# Patient Record
Sex: MALE | Race: OTHER | HISPANIC OR LATINO | Employment: FULL TIME | URBAN - METROPOLITAN AREA
[De-identification: names, ages, dates, MRNs, and addresses within clinical notes are randomized per-mention and may not be internally consistent; named-entity substitution may affect disease eponyms.]

---

## 2023-05-28 ENCOUNTER — HOSPITAL ENCOUNTER (EMERGENCY)
Facility: HOSPITAL | Age: 38
Discharge: HOME | End: 2023-05-29
Attending: EMERGENCY MEDICINE
Payer: COMMERCIAL

## 2023-05-28 DIAGNOSIS — S61.412A LACERATION OF LEFT HAND WITHOUT FOREIGN BODY, INITIAL ENCOUNTER: Primary | ICD-10-CM

## 2023-05-28 PROCEDURE — 99284 EMERGENCY DEPT VISIT MOD MDM: CPT | Mod: 25

## 2023-05-28 RX ORDER — LIDOCAINE HYDROCHLORIDE 10 MG/ML
10 INJECTION INFILTRATION; PERINEURAL
Status: COMPLETED | OUTPATIENT
Start: 2023-05-28 | End: 2023-05-29

## 2023-05-29 VITALS
WEIGHT: 230 LBS | RESPIRATION RATE: 18 BRPM | DIASTOLIC BLOOD PRESSURE: 76 MMHG | OXYGEN SATURATION: 100 % | TEMPERATURE: 98 F | SYSTOLIC BLOOD PRESSURE: 127 MMHG | HEART RATE: 110 BPM | BODY MASS INDEX: 30.48 KG/M2 | HEIGHT: 73 IN

## 2023-05-29 PROCEDURE — 25000003 PHARM REV CODE 250: Performed by: NURSE PRACTITIONER

## 2023-05-29 PROCEDURE — 12001 RPR S/N/AX/GEN/TRNK 2.5CM/<: CPT

## 2023-05-29 RX ADMIN — BACITRACIN ZINC, NEOMYCIN, POLYMYXIN B 1 EACH: 400; 3.5; 5 OINTMENT TOPICAL at 12:05

## 2023-05-29 RX ADMIN — LIDOCAINE HYDROCHLORIDE 10 ML: 10 INJECTION, SOLUTION INFILTRATION; PERINEURAL at 12:05

## 2023-05-29 NOTE — DISCHARGE INSTRUCTIONS
Sutures/stitches are to be removed within 10-14 days.  Monitor for any signs of infection such as redness, fever, severe pain or other concerns.  Recommend Tylenol or ibuprofen as needed pain control.    1. Las suturas/puntos deben retirarse en un plazo de 10 a 14 días.  2. Supervise cualquier signo de infección, yelena enrojecimiento, fiebre, dolor intenso u otras preocupaciones. Recomiende Tylenol o ibuprofeno según sea necesario para el control del dolor.

## 2023-05-29 NOTE — ED PROVIDER NOTES
Encounter Date: 5/28/2023    SCRIBE #1 NOTE: I, Urmila Tracie, am scribing for, and in the presence of,  Simeon Arreola MD.     History     Chief Complaint   Patient presents with    Laceration     Pt presents to the ED with c/o laceration to left lateral knuckle x1 hour. States she was reaching into cooler when his hand hit a broken bottle. Bleeding controlled in triage. Pressure bandage applied. Unknown last tetanus     38 yo M presents to the ED with a wound sustained 1h PTA today. No prior PMHx. He was watching a soccer game, when he reached his hand into the cooler, unaware that there was a broken glass bottle in the cooler. This caused his left hand to sustain a wound, prompting him to the ED. Unsure if Tdap is UTD. No other trauma or injuries. This is the extent of the patient's complaints today in the Emergency Department.     The history is provided by the patient.   Review of patient's allergies indicates:  No Known Allergies  History reviewed. No pertinent past medical history.  History reviewed. No pertinent surgical history.  History reviewed. No pertinent family history.  Social History     Tobacco Use    Smoking status: Never    Smokeless tobacco: Never     Review of Systems   Constitutional:  Negative for chills and fever.   HENT:  Negative for congestion, rhinorrhea and sore throat.    Eyes:  Negative for visual disturbance.   Respiratory:  Negative for cough and shortness of breath.    Cardiovascular:  Negative for chest pain.   Gastrointestinal:  Negative for abdominal pain, diarrhea, nausea and vomiting.   Genitourinary:  Negative for dysuria, frequency and hematuria.   Musculoskeletal:  Negative for back pain.   Skin:  Positive for wound. Negative for rash.   Neurological:  Negative for dizziness, weakness and headaches.     Physical Exam     Initial Vitals [05/28/23 2321]   BP Pulse Resp Temp SpO2   (!) 159/90 (!) 111 16 98.5 °F (36.9 °C) 98 %      MAP       --         Physical Exam    Nursing  note and vitals reviewed.  Constitutional: He appears well-developed. He is not diaphoretic. No distress.   HENT:   Head: Normocephalic and atraumatic.   Eyes: EOM are normal.   Cardiovascular:  Normal rate.           No murmur heard.  Pulmonary/Chest: Effort normal. No respiratory distress.   Abdominal: He exhibits no distension.   Musculoskeletal:         General: Normal range of motion.      Comments: L hand; Linear laceration to the dorsum underlying 5th phalanx. Tendon is visualized but no tendon laceration. Able to extend and flex 5th digit without difficulty.      Neurological: He is alert.   Skin: Skin is warm.       ED Course   Lac Repair    Date/Time: 5/29/2023 12:40 AM  Performed by: Simeon Arreola MD  Authorized by: Simeon Arreola MD     Consent:     Consent obtained:  Verbal    Consent given by:  Patient    Risks discussed:  Pain, need for additional repair and infection    Alternatives discussed:  Delayed treatment and no treatment  Universal protocol:     Procedure explained and questions answered to patient or proxy's satisfaction: yes      Patient identity confirmed:  Verbally with patient  Anesthesia:     Anesthesia method:  Local infiltration    Local anesthetic:  Lidocaine 1% w/o epi  Laceration details:     Location:  Hand    Hand location:  L hand, dorsum    Length (cm):  2  Pre-procedure details:     Preparation:  Patient was prepped and draped in usual sterile fashion  Exploration:     Hemostasis achieved with:  Direct pressure    Wound exploration: wound explored through full range of motion      Contaminated: no    Treatment:     Area cleansed with:  Betadine and saline    Amount of cleaning:  Standard    Irrigation solution:  Sterile saline    Irrigation method:  Syringe    Visualized foreign bodies/material removed: no      Debridement:  None    Undermining:  None    Scar revision: no    Skin repair:     Repair method:  Sutures    Suture size:  5-0 and 3-0    Wound skin closure  material used: ethylon.    Suture technique:  Simple interrupted    Number of sutures:  5  Approximation:     Approximation:  Close  Repair type:     Repair type:  Simple  Post-procedure details:     Dressing:  Antibiotic ointment and sterile dressing    Procedure completion:  Tolerated well, no immediate complications  Labs Reviewed - No data to display       Imaging Results              X-Ray Hand 3 View Left (Final result)  Result time 05/29/23 00:49:16   Procedure changed from X-Ray Hand 3 view Right     Final result by Roosevelt Goldman MD (05/29/23 00:49:16)                   Impression:      No radiographic evidence of acute displaced fracture or discrete retained radiopaque foreign body.      Electronically signed by: Roosevelt Goldman MD  Date:    05/29/2023  Time:    00:49               Narrative:    EXAMINATION:  XR HAND COMPLETE 3 VIEW LEFT    CLINICAL HISTORY:  laceration;.    TECHNIQUE:  PA, lateral, and oblique views of the left hand were performed.    COMPARISON:  None    FINDINGS:  There is no radiographic evidence of acute displaced fracture or dislocation.  Joint spaces appear well maintained.  No definite discrete retained radiopaque foreign body identified within the visualized soft tissues.                                       Medications   LIDOcaine HCL 10 mg/ml (1%) injection 10 mL (10 mLs Infiltration Given 5/29/23 0035)   neomycin-bacitracnZn-polymyxnB packet (1 each Topical (Top) Given 5/29/23 0035)     Medical Decision Making:   History:   Old Medical Records: I decided to obtain old medical records.  Old Records Summarized: other records.       <> Summary of Records: External documents reviewed.   Initial Assessment:   29-year-old male presenting with left hand injury.  Injury sustained after accidentally cutting hand on glass. Appears to be no tendon injury.  Full range of motion of the injury.  Tetanus vaccine ordered.  Wound irrigated and sutured.  Discussed monitoring for signs of  wound infection.  Discussed appropriate mood sutures within 10-14 days.  Differential Diagnosis:   Tendon laceration, laceration  Clinical Tests:   Radiological Study: Ordered and Reviewed        Scribe Attestation:   Scribe #1: I performed the above scribed service and the documentation accurately describes the services I performed. I attest to the accuracy of the note.                 I, Simeon Mijares, personally performed the services described in this documentation. All medical record entries made by the scribe were at my direction and in my presence. I have reviewed the chart and agree that the record reflects my personal performance and is accurate and complete.    Clinical Impression:   Final diagnoses:  [S61.412A] Laceration of left hand without foreign body, initial encounter (Primary)        ED Disposition Condition    Discharge Stable          ED Prescriptions    None       Follow-up Information       Follow up With Specialties Details Why Contact Info    [facility] [place] - Emergency Dept Emergency Medicine  If symptoms worsen [id number]  [facility] [id number]  [phone]             Simeon Arreola MD  05/29/23 9742